# Patient Record
Sex: MALE | Race: BLACK OR AFRICAN AMERICAN | Employment: STUDENT | URBAN - METROPOLITAN AREA
[De-identification: names, ages, dates, MRNs, and addresses within clinical notes are randomized per-mention and may not be internally consistent; named-entity substitution may affect disease eponyms.]

---

## 2017-06-18 ENCOUNTER — HOSPITAL ENCOUNTER (INPATIENT)
Age: 22
LOS: 2 days | Discharge: HOME | DRG: 282 | End: 2017-06-20
Attending: EMERGENCY MEDICINE | Admitting: INTERNAL MEDICINE
Payer: COMMERCIAL

## 2017-06-18 ENCOUNTER — APPOINTMENT (OUTPATIENT)
Dept: GENERAL RADIOLOGY | Age: 22
DRG: 282 | End: 2017-06-18
Attending: EMERGENCY MEDICINE
Payer: COMMERCIAL

## 2017-06-18 DIAGNOSIS — R07.9 ACUTE CHEST PAIN: ICD-10-CM

## 2017-06-18 DIAGNOSIS — R77.8 ELEVATED TROPONIN: Primary | ICD-10-CM

## 2017-06-18 PROBLEM — I21.4 NSTEMI (NON-ST ELEVATED MYOCARDIAL INFARCTION) (HCC): Status: ACTIVE | Noted: 2017-06-18

## 2017-06-18 LAB
ALBUMIN SERPL BCP-MCNC: 3.7 G/DL (ref 3.5–5)
ALBUMIN/GLOB SERPL: 0.8 {RATIO} (ref 1.1–2.2)
ALP SERPL-CCNC: 39 U/L (ref 45–117)
ALT SERPL-CCNC: 28 U/L (ref 12–78)
ANION GAP BLD CALC-SCNC: 12 MMOL/L (ref 5–15)
AST SERPL W P-5'-P-CCNC: 19 U/L (ref 15–37)
ATRIAL RATE: 65 BPM
ATRIAL RATE: 75 BPM
ATRIAL RATE: 91 BPM
B PERT DNA SPEC QL NAA+PROBE: NOT DETECTED
BASOPHILS # BLD AUTO: 0 K/UL (ref 0–0.1)
BASOPHILS # BLD: 0 % (ref 0–1)
BILIRUB DIRECT SERPL-MCNC: 0.2 MG/DL (ref 0–0.2)
BILIRUB SERPL-MCNC: 0.6 MG/DL (ref 0.2–1)
BUN SERPL-MCNC: 15 MG/DL (ref 6–20)
BUN/CREAT SERPL: 11 (ref 12–20)
C PNEUM DNA SPEC QL NAA+PROBE: NOT DETECTED
CALCIUM SERPL-MCNC: 9.1 MG/DL (ref 8.5–10.1)
CALCULATED P AXIS, ECG09: 25 DEGREES
CALCULATED P AXIS, ECG09: 26 DEGREES
CALCULATED P AXIS, ECG09: 28 DEGREES
CALCULATED R AXIS, ECG10: 19 DEGREES
CALCULATED R AXIS, ECG10: 22 DEGREES
CALCULATED R AXIS, ECG10: 33 DEGREES
CALCULATED T AXIS, ECG11: 115 DEGREES
CALCULATED T AXIS, ECG11: 90 DEGREES
CALCULATED T AXIS, ECG11: 95 DEGREES
CHLORIDE SERPL-SCNC: 100 MMOL/L (ref 97–108)
CO2 SERPL-SCNC: 24 MMOL/L (ref 21–32)
CREAT SERPL-MCNC: 1.32 MG/DL (ref 0.7–1.3)
DIAGNOSIS, 93000: NORMAL
DIFFERENTIAL METHOD BLD: ABNORMAL
EOSINOPHIL # BLD: 0 K/UL (ref 0–0.4)
EOSINOPHIL NFR BLD: 0 % (ref 0–7)
ERYTHROCYTE [DISTWIDTH] IN BLOOD BY AUTOMATED COUNT: 13.9 % (ref 11.5–14.5)
FLUAV H1 2009 PAND RNA SPEC QL NAA+PROBE: NOT DETECTED
FLUAV H1 RNA SPEC QL NAA+PROBE: NOT DETECTED
FLUAV H3 RNA SPEC QL NAA+PROBE: NOT DETECTED
FLUAV SUBTYP SPEC NAA+PROBE: NOT DETECTED
FLUBV RNA SPEC QL NAA+PROBE: NOT DETECTED
GLOBULIN SER CALC-MCNC: 4.5 G/DL (ref 2–4)
GLUCOSE SERPL-MCNC: 98 MG/DL (ref 65–100)
HADV DNA SPEC QL NAA+PROBE: NOT DETECTED
HCOV 229E RNA SPEC QL NAA+PROBE: NOT DETECTED
HCOV HKU1 RNA SPEC QL NAA+PROBE: NOT DETECTED
HCOV NL63 RNA SPEC QL NAA+PROBE: NOT DETECTED
HCOV OC43 RNA SPEC QL NAA+PROBE: NOT DETECTED
HCT VFR BLD AUTO: 40 % (ref 36.6–50.3)
HGB BLD-MCNC: 13.2 G/DL (ref 12.1–17)
HMPV RNA SPEC QL NAA+PROBE: NOT DETECTED
HPIV1 RNA SPEC QL NAA+PROBE: NOT DETECTED
HPIV2 RNA SPEC QL NAA+PROBE: NOT DETECTED
HPIV3 RNA SPEC QL NAA+PROBE: NOT DETECTED
HPIV4 RNA SPEC QL NAA+PROBE: NOT DETECTED
LYMPHOCYTES # BLD AUTO: 20 % (ref 12–49)
LYMPHOCYTES # BLD: 2.4 K/UL (ref 0.8–3.5)
M PNEUMO DNA SPEC QL NAA+PROBE: NOT DETECTED
MCH RBC QN AUTO: 25.7 PG (ref 26–34)
MCHC RBC AUTO-ENTMCNC: 33 G/DL (ref 30–36.5)
MCV RBC AUTO: 77.8 FL (ref 80–99)
MONOCYTES # BLD: 2.1 K/UL (ref 0–1)
MONOCYTES NFR BLD AUTO: 18 % (ref 5–13)
NEUTS BAND NFR BLD MANUAL: 1 % (ref 0–6)
NEUTS SEG # BLD: 7.4 K/UL (ref 1.8–8)
NEUTS SEG NFR BLD AUTO: 61 % (ref 32–75)
P-R INTERVAL, ECG05: 168 MS
P-R INTERVAL, ECG05: 176 MS
P-R INTERVAL, ECG05: 200 MS
PATH REV BLD -IMP: ABNORMAL
PLATELET # BLD AUTO: 293 K/UL (ref 150–400)
PLATELET COMMENTS,PCOM: ABNORMAL
POTASSIUM SERPL-SCNC: 3.7 MMOL/L (ref 3.5–5.1)
PROT SERPL-MCNC: 8.2 G/DL (ref 6.4–8.2)
Q-T INTERVAL, ECG07: 338 MS
Q-T INTERVAL, ECG07: 380 MS
Q-T INTERVAL, ECG07: 392 MS
QRS DURATION, ECG06: 100 MS
QRS DURATION, ECG06: 100 MS
QRS DURATION, ECG06: 102 MS
QTC CALCULATION (BEZET), ECG08: 395 MS
QTC CALCULATION (BEZET), ECG08: 415 MS
QTC CALCULATION (BEZET), ECG08: 416 MS
RBC # BLD AUTO: 5.14 M/UL (ref 4.1–5.7)
RBC MORPH BLD: ABNORMAL
RSV RNA SPEC QL NAA+PROBE: NOT DETECTED
RV+EV RNA SPEC QL NAA+PROBE: NOT DETECTED
SODIUM SERPL-SCNC: 136 MMOL/L (ref 136–145)
TROPONIN I SERPL-MCNC: 0.76 NG/ML
TROPONIN I SERPL-MCNC: 1.06 NG/ML
TROPONIN I SERPL-MCNC: 2.28 NG/ML
TROPONIN I SERPL-MCNC: 2.54 NG/ML
VENTRICULAR RATE, ECG03: 65 BPM
VENTRICULAR RATE, ECG03: 68 BPM
VENTRICULAR RATE, ECG03: 91 BPM
WBC # BLD AUTO: 11.9 K/UL (ref 4.1–11.1)
WBC MORPH BLD: ABNORMAL

## 2017-06-18 PROCEDURE — 93458 L HRT ARTERY/VENTRICLE ANGIO: CPT

## 2017-06-18 PROCEDURE — 86658 ENTEROVIRUS ANTIBODY: CPT | Performed by: INTERNAL MEDICINE

## 2017-06-18 PROCEDURE — 77010033678 HC OXYGEN DAILY

## 2017-06-18 PROCEDURE — C1894 INTRO/SHEATH, NON-LASER: HCPCS

## 2017-06-18 PROCEDURE — 74011250637 HC RX REV CODE- 250/637: Performed by: INTERNAL MEDICINE

## 2017-06-18 PROCEDURE — 74011000250 HC RX REV CODE- 250: Performed by: INTERNAL MEDICINE

## 2017-06-18 PROCEDURE — 77030004532 HC CATH ANGI DX IMP BSC -A

## 2017-06-18 PROCEDURE — 71020 XR CHEST PA LAT: CPT

## 2017-06-18 PROCEDURE — 84484 ASSAY OF TROPONIN QUANT: CPT | Performed by: EMERGENCY MEDICINE

## 2017-06-18 PROCEDURE — B2151ZZ FLUOROSCOPY OF LEFT HEART USING LOW OSMOLAR CONTRAST: ICD-10-PCS | Performed by: INTERNAL MEDICINE

## 2017-06-18 PROCEDURE — 99284 EMERGENCY DEPT VISIT MOD MDM: CPT

## 2017-06-18 PROCEDURE — C1760 CLOSURE DEV, VASC: HCPCS

## 2017-06-18 PROCEDURE — B2111ZZ FLUOROSCOPY OF MULTIPLE CORONARY ARTERIES USING LOW OSMOLAR CONTRAST: ICD-10-PCS | Performed by: INTERNAL MEDICINE

## 2017-06-18 PROCEDURE — 74011250637 HC RX REV CODE- 250/637: Performed by: EMERGENCY MEDICINE

## 2017-06-18 PROCEDURE — 74011636320 HC RX REV CODE- 636/320: Performed by: INTERNAL MEDICINE

## 2017-06-18 PROCEDURE — 77030013715 HC INFL SYS MRTM -B

## 2017-06-18 PROCEDURE — 93041 RHYTHM ECG TRACING: CPT

## 2017-06-18 PROCEDURE — 65610000006 HC RM INTENSIVE CARE

## 2017-06-18 PROCEDURE — 93005 ELECTROCARDIOGRAM TRACING: CPT

## 2017-06-18 PROCEDURE — 87633 RESP VIRUS 12-25 TARGETS: CPT | Performed by: EMERGENCY MEDICINE

## 2017-06-18 PROCEDURE — 77030004533 HC CATH ANGI DX IMP BSC -B

## 2017-06-18 PROCEDURE — 80076 HEPATIC FUNCTION PANEL: CPT | Performed by: EMERGENCY MEDICINE

## 2017-06-18 PROCEDURE — 85025 COMPLETE CBC W/AUTO DIFF WBC: CPT | Performed by: EMERGENCY MEDICINE

## 2017-06-18 PROCEDURE — 80048 BASIC METABOLIC PNL TOTAL CA: CPT | Performed by: EMERGENCY MEDICINE

## 2017-06-18 PROCEDURE — 4A023N7 MEASUREMENT OF CARDIAC SAMPLING AND PRESSURE, LEFT HEART, PERCUTANEOUS APPROACH: ICD-10-PCS | Performed by: INTERNAL MEDICINE

## 2017-06-18 PROCEDURE — 74011250636 HC RX REV CODE- 250/636: Performed by: INTERNAL MEDICINE

## 2017-06-18 PROCEDURE — 77030013744

## 2017-06-18 PROCEDURE — 36415 COLL VENOUS BLD VENIPUNCTURE: CPT | Performed by: INTERNAL MEDICINE

## 2017-06-18 RX ORDER — FENTANYL CITRATE 50 UG/ML
25-200 INJECTION, SOLUTION INTRAMUSCULAR; INTRAVENOUS
Status: DISCONTINUED | OUTPATIENT
Start: 2017-06-18 | End: 2017-06-18

## 2017-06-18 RX ORDER — PRASUGREL 10 MG/1
10-60 TABLET, FILM COATED ORAL AS NEEDED
Status: DISCONTINUED | OUTPATIENT
Start: 2017-06-18 | End: 2017-06-18

## 2017-06-18 RX ORDER — GUAIFENESIN 100 MG/5ML
200 SOLUTION ORAL
COMMUNITY

## 2017-06-18 RX ORDER — MIDAZOLAM HYDROCHLORIDE 1 MG/ML
.5-1 INJECTION, SOLUTION INTRAMUSCULAR; INTRAVENOUS
Status: DISCONTINUED | OUTPATIENT
Start: 2017-06-18 | End: 2017-06-18

## 2017-06-18 RX ORDER — GUAIFENESIN 100 MG/5ML
324 LIQUID (ML) ORAL
Status: COMPLETED | OUTPATIENT
Start: 2017-06-18 | End: 2017-06-18

## 2017-06-18 RX ORDER — IBUPROFEN 600 MG/1
600 TABLET ORAL
Status: COMPLETED | OUTPATIENT
Start: 2017-06-18 | End: 2017-06-18

## 2017-06-18 RX ORDER — SODIUM CHLORIDE 0.9 % (FLUSH) 0.9 %
5-10 SYRINGE (ML) INJECTION AS NEEDED
Status: DISCONTINUED | OUTPATIENT
Start: 2017-06-18 | End: 2017-06-20 | Stop reason: HOSPADM

## 2017-06-18 RX ORDER — CARVEDILOL 6.25 MG/1
6.25 TABLET ORAL 2 TIMES DAILY WITH MEALS
Status: DISCONTINUED | OUTPATIENT
Start: 2017-06-18 | End: 2017-06-20 | Stop reason: HOSPADM

## 2017-06-18 RX ORDER — METOPROLOL TARTRATE 5 MG/5ML
5 INJECTION INTRAVENOUS
Status: DISCONTINUED | OUTPATIENT
Start: 2017-06-18 | End: 2017-06-18

## 2017-06-18 RX ORDER — HEPARIN SODIUM 200 [USP'U]/100ML
1000 INJECTION, SOLUTION INTRAVENOUS AS NEEDED
Status: DISCONTINUED | OUTPATIENT
Start: 2017-06-18 | End: 2017-06-18

## 2017-06-18 RX ORDER — HYDROCORTISONE SODIUM SUCCINATE 100 MG/2ML
100 INJECTION, POWDER, FOR SOLUTION INTRAMUSCULAR; INTRAVENOUS EVERY 8 HOURS
Status: DISCONTINUED | OUTPATIENT
Start: 2017-06-18 | End: 2017-06-19

## 2017-06-18 RX ORDER — AMLODIPINE BESYLATE 5 MG/1
10 TABLET ORAL DAILY
Status: DISCONTINUED | OUTPATIENT
Start: 2017-06-18 | End: 2017-06-20 | Stop reason: HOSPADM

## 2017-06-18 RX ORDER — DOXYCYCLINE 100 MG/1
100 CAPSULE ORAL 2 TIMES DAILY
COMMUNITY

## 2017-06-18 RX ORDER — SODIUM CHLORIDE 9 MG/ML
1.5 INJECTION, SOLUTION INTRAVENOUS CONTINUOUS
Status: DISCONTINUED | OUTPATIENT
Start: 2017-06-18 | End: 2017-06-18 | Stop reason: HOSPADM

## 2017-06-18 RX ORDER — SODIUM CHLORIDE 9 MG/ML
125 INJECTION, SOLUTION INTRAVENOUS CONTINUOUS
Status: DISCONTINUED | OUTPATIENT
Start: 2017-06-18 | End: 2017-06-19

## 2017-06-18 RX ORDER — LIDOCAINE HYDROCHLORIDE 10 MG/ML
10-30 INJECTION INFILTRATION; PERINEURAL ONCE
Status: COMPLETED | OUTPATIENT
Start: 2017-06-18 | End: 2017-06-18

## 2017-06-18 RX ORDER — CLOPIDOGREL 300 MG/1
600 TABLET, FILM COATED ORAL AS NEEDED
Status: DISCONTINUED | OUTPATIENT
Start: 2017-06-18 | End: 2017-06-18

## 2017-06-18 RX ORDER — SODIUM CHLORIDE 9 MG/ML
3 INJECTION, SOLUTION INTRAVENOUS CONTINUOUS
Status: DISCONTINUED | OUTPATIENT
Start: 2017-06-18 | End: 2017-06-18 | Stop reason: HOSPADM

## 2017-06-18 RX ORDER — SODIUM CHLORIDE 0.9 % (FLUSH) 0.9 %
5-10 SYRINGE (ML) INJECTION EVERY 8 HOURS
Status: DISCONTINUED | OUTPATIENT
Start: 2017-06-18 | End: 2017-06-20 | Stop reason: HOSPADM

## 2017-06-18 RX ORDER — HEPARIN SODIUM 1000 [USP'U]/ML
10000 INJECTION, SOLUTION INTRAVENOUS; SUBCUTANEOUS
Status: DISCONTINUED | OUTPATIENT
Start: 2017-06-18 | End: 2017-06-18

## 2017-06-18 RX ORDER — MORPHINE SULFATE 2 MG/ML
2 INJECTION, SOLUTION INTRAMUSCULAR; INTRAVENOUS
Status: DISCONTINUED | OUTPATIENT
Start: 2017-06-18 | End: 2017-06-20 | Stop reason: HOSPADM

## 2017-06-18 RX ORDER — SODIUM CHLORIDE 0.9 % (FLUSH) 0.9 %
10 SYRINGE (ML) INJECTION AS NEEDED
Status: DISCONTINUED | OUTPATIENT
Start: 2017-06-18 | End: 2017-06-18

## 2017-06-18 RX ORDER — ACETAMINOPHEN 325 MG/1
650 TABLET ORAL
Status: DISCONTINUED | OUTPATIENT
Start: 2017-06-18 | End: 2017-06-20 | Stop reason: HOSPADM

## 2017-06-18 RX ORDER — ATROPINE SULFATE 0.1 MG/ML
1 INJECTION INTRAVENOUS AS NEEDED
Status: DISCONTINUED | OUTPATIENT
Start: 2017-06-18 | End: 2017-06-18

## 2017-06-18 RX ADMIN — Medication 2 MG: at 14:53

## 2017-06-18 RX ADMIN — Medication 10 ML: at 23:00

## 2017-06-18 RX ADMIN — SODIUM CHLORIDE 125 ML/HR: 900 INJECTION, SOLUTION INTRAVENOUS at 14:53

## 2017-06-18 RX ADMIN — CARVEDILOL 6.25 MG: 6.25 TABLET, FILM COATED ORAL at 08:48

## 2017-06-18 RX ADMIN — MIDAZOLAM HYDROCHLORIDE 2 MG: 1 INJECTION, SOLUTION INTRAMUSCULAR; INTRAVENOUS at 04:30

## 2017-06-18 RX ADMIN — ACETAMINOPHEN 650 MG: 325 TABLET, FILM COATED ORAL at 05:43

## 2017-06-18 RX ADMIN — HEPARIN SODIUM 2000 UNITS: 200 INJECTION, SOLUTION INTRAVENOUS at 04:32

## 2017-06-18 RX ADMIN — IBUPROFEN 600 MG: 600 TABLET, FILM COATED ORAL at 00:20

## 2017-06-18 RX ADMIN — SODIUM CHLORIDE 3 ML/KG/HR: 900 INJECTION, SOLUTION INTRAVENOUS at 04:24

## 2017-06-18 RX ADMIN — HYDROCORTISONE SODIUM SUCCINATE 100 MG: 100 INJECTION, POWDER, FOR SOLUTION INTRAMUSCULAR; INTRAVENOUS at 23:00

## 2017-06-18 RX ADMIN — IOPAMIDOL 88 ML: 755 INJECTION, SOLUTION INTRAVENOUS at 04:44

## 2017-06-18 RX ADMIN — SODIUM CHLORIDE 125 ML/HR: 900 INJECTION, SOLUTION INTRAVENOUS at 21:00

## 2017-06-18 RX ADMIN — Medication 10 ML: at 05:43

## 2017-06-18 RX ADMIN — AMLODIPINE BESYLATE 10 MG: 5 TABLET ORAL at 08:48

## 2017-06-18 RX ADMIN — HYDROCORTISONE SODIUM SUCCINATE 100 MG: 100 INJECTION, POWDER, FOR SOLUTION INTRAMUSCULAR; INTRAVENOUS at 05:43

## 2017-06-18 RX ADMIN — CARVEDILOL 6.25 MG: 6.25 TABLET, FILM COATED ORAL at 16:45

## 2017-06-18 RX ADMIN — FENTANYL CITRATE 50 MCG: 50 INJECTION, SOLUTION INTRAMUSCULAR; INTRAVENOUS at 04:30

## 2017-06-18 RX ADMIN — HYDROCORTISONE SODIUM SUCCINATE 100 MG: 100 INJECTION, POWDER, FOR SOLUTION INTRAMUSCULAR; INTRAVENOUS at 14:53

## 2017-06-18 RX ADMIN — ASPIRIN 81 MG 324 MG: 81 TABLET ORAL at 02:44

## 2017-06-18 RX ADMIN — SODIUM CHLORIDE 125 ML/HR: 900 INJECTION, SOLUTION INTRAVENOUS at 07:13

## 2017-06-18 RX ADMIN — METOROPROLOL TARTRATE 5 MG: 5 INJECTION, SOLUTION INTRAVENOUS at 04:32

## 2017-06-18 RX ADMIN — LIDOCAINE HYDROCHLORIDE 10 ML: 10 INJECTION, SOLUTION INFILTRATION; PERINEURAL at 04:31

## 2017-06-18 NOTE — PROGRESS NOTES
0730: Report received from [address]: Bedside shift change report given to COBY Chow (oncoming nurse) by Anjel Fuchs RN (offgoing nurse). Report included the following information SBAR, Kardex, ED Summary, Procedure Summary, Intake/Output, MAR, Recent Results and Cardiac Rhythm NSR/SB.

## 2017-06-18 NOTE — PROGRESS NOTES
Cardiac Cath Lab Procedure Area Arrival Note:    Je Lucas arrived to Cardiac Cath Lab, Procedure Area. Patient identifiers verified with NAME and DATE OF BIRTH. Procedure verified with patient. Consent forms verified. Allergies verified. Patient informed of procedure and plan of care. Questions answered with review. Patient voiced understanding of procedure and plan of care. Patient on cardiac monitor, non-invasive blood pressure, SPO2 monitorO2 @ 2 lpm via NC.  IV of NS on pump at 100 ml/hr. Patient status doing well with some problems : Pt diaphoretic. Patient is A&Ox 4. Patient reports CP 6/10. Patient medicated during procedure with orders obtained and verified by Dr. Lisa Melton. Refer to patients Cardiac Cath Lab PROCEDURE REPORT for vital signs, assessment, status, and response during procedure, printed at end of case. Printed report on chart or scanned into chart.

## 2017-06-18 NOTE — H&P
Cardiology History and Physical     Date of  Admission: 6/18/2017     Admission type: Emergency    Francesco Call is a 23 y.o. male admitted for CP. It is SS location with various aches in arms and shoulder area. This started last night and is persisting 6/10. He has profuse sweats which is very unusual. This all started with some non-productive cough for last two days but CP only started tonight. It is not pleuritic or positional. His CRFs are family history or premature CAD in his uncle and HTN. He has not checked his lipids recently. His first troponin is elevated at 2.28 and ekg shows ST elevation in II, III, & AVF. There is reciprocal ST depression in I & AVL. Then there is early repolarization ST elevation in anterior precordial leads. No prior ekg for comparison. Mariana Cedeno MD  Past Medical History:   Diagnosis Date    Hypertension       Past Surgical History:   Procedure Laterality Date    HX APPENDECTOMY       History reviewed. No pertinent family history. Prior to Admission medications    Medication Sig Start Date End Date Taking? Authorizing Provider   guaiFENesin (ROBITUSSIN) 100 mg/5 mL liquid Take 200 mg by mouth three (3) times daily as needed for Cough. Yes Oskar Sarah MD   doxycycline (MONODOX) 100 mg capsule Take 100 mg by mouth two (2) times a day. Yes Oskar Sarah MD   Saccharomyces boulardii (FLORASTORKIDS) 250 mg pack Take 250 mg by mouth two (2) times a day. Oskar Sarah MD   amLODIPine (NORVASC) 10 mg tablet Take  by mouth daily. Oskar Sarah MD      No Known Allergies     Review of Systems    Review of Systems  Except as noted in HPI, patient denies recent fever or chills, nausea, vomiting, diarrhea, hemoptysis, hematemesis, dysuria, myalgias, focal neurologic symptoms, ecchymosis, angioedema, odynophagia, dysphagia, sore throat, earache,rash, melena, hematochezia, depression, GERD, cold intolerance, petechia, bleeding gums, or significant weight loss.     A comprehensive review of systems was negative except for that written in the HPI. Physical Exam    Objective:    Physical Exam:  24 hr VS reviewed, overall VSSAF  Temp (24hrs), Av.4 °F (36.9 °C), Min:98.3 °F (36.8 °C), Max:98.5 °F (36.9 °C)    Patient Vitals for the past 8 hrs:   Pulse   17 0240 73   17 0011 81    Patient Vitals for the past 8 hrs:   Resp   17 0240 20   17 0011 20    Patient Vitals for the past 8 hrs:   BP   17 0240 134/68   17 0011 134/78        No intake or output data in the 24 hours ending 17 0352  General Appearance:   [x] well developed, well nourished,  [x]NAD.       []     agitated   []     lethargic but arousable  []     obtunded   ENT, Palate:    [x] WNL   []     dry palate/MM     [x]anicteric     Respiratory:    [x]CTA bilateral  [] rales  [] rhonchi  [] normal resp effort   Cardiovascular:   [x] RRR   [] Irregular rate and rhythm  [] ectopy   [x] Normal S1, S2   [x]  s4 gallop    [] no murmur   [] murmur c/w:   [x]  no edema RLE:[]1+ [] 2+ []3+; LLE:[]1+ []2+ [] 3+   [x]  normal JVP   []     Elevated JVP    [x] carotid upstroke nl   [x] abd aorta not palpated   [x] no stigmata of peripheral emboli   GI:    [x] abd soft, non-distented,bowel sounds present, no                     organomegaly appreciated   Skin:  Neuro:      [x]  warm and dry []     cold extremities   [x]  A/O x 3,  [x]     grossly non-focal    []  lethargic but arousable  [] obtunded        Cardiographics    Telemetry: normal sinus rhythm  ECG: normal EKG, normal sinus rhythm, unchanged from previous tracings  Echocardiogram: Not done    Labs:   Recent Results (from the past 24 hour(s))   EKG, 12 LEAD, INITIAL    Collection Time: 17  1:19 AM   Result Value Ref Range    Ventricular Rate 66 BPM    Atrial Rate 66 BPM    P-R Interval 184 ms    QRS Duration 100 ms    Q-T Interval 376 ms    QTC Calculation (Bezet) 394 ms    Calculated P Axis 24 degrees    Calculated R Axis 33 degrees    Calculated T Axis 108 degrees    Diagnosis       Normal sinus rhythm with sinus arrhythmia  ST elevation, consider inferior injury or acute infarct  ** ** ACUTE MI / STEMI ** **  Consider right ventricular involvement in acute inferior infarct  No previous ECGs available     CBC WITH AUTOMATED DIFF    Collection Time: 06/18/17  1:36 AM   Result Value Ref Range    WBC 11.9 (H) 4.1 - 11.1 K/uL    RBC 5.14 4.10 - 5.70 M/uL    HGB 13.2 12.1 - 17.0 g/dL    HCT 40.0 36.6 - 50.3 %    MCV 77.8 (L) 80.0 - 99.0 FL    MCH 25.7 (L) 26.0 - 34.0 PG    MCHC 33.0 30.0 - 36.5 g/dL    RDW 13.9 11.5 - 14.5 %    PLATELET 261 467 - 650 K/uL    NEUTROPHILS 61 32 - 75 %    BAND NEUTROPHILS 1 0 - 6 %    LYMPHOCYTES 20 12 - 49 %    MONOCYTES 18 (H) 5 - 13 %    EOSINOPHILS 0 0 - 7 %    BASOPHILS 0 0 - 1 %    ABS. NEUTROPHILS 7.4 1.8 - 8.0 K/UL    ABS. LYMPHOCYTES 2.4 0.8 - 3.5 K/UL    ABS. MONOCYTES 2.1 (H) 0.0 - 1.0 K/UL    ABS. EOSINOPHILS 0.0 0.0 - 0.4 K/UL    ABS.  BASOPHILS 0.0 0.0 - 0.1 K/UL    DF MANUAL      PLATELET COMMENTS LARGE PLATELETS      RBC COMMENTS ANISOCYTOSIS  1+        WBC COMMENTS Pathology Review Requested     METABOLIC PANEL, BASIC    Collection Time: 06/18/17  1:36 AM   Result Value Ref Range    Sodium 136 136 - 145 mmol/L    Potassium 3.7 3.5 - 5.1 mmol/L    Chloride 100 97 - 108 mmol/L    CO2 24 21 - 32 mmol/L    Anion gap 12 5 - 15 mmol/L    Glucose 98 65 - 100 mg/dL    BUN 15 6 - 20 MG/DL    Creatinine 1.32 (H) 0.70 - 1.30 MG/DL    BUN/Creatinine ratio 11 (L) 12 - 20      GFR est AA >60 >60 ml/min/1.73m2    GFR est non-AA >60 >60 ml/min/1.73m2    Calcium 9.1 8.5 - 10.1 MG/DL   TROPONIN I    Collection Time: 06/18/17  1:36 AM   Result Value Ref Range    Troponin-I, Qt. 2.28 (H) <0.05 ng/mL   HEPATIC FUNCTION PANEL    Collection Time: 06/18/17  1:36 AM   Result Value Ref Range    Protein, total 8.2 6.4 - 8.2 g/dL    Albumin 3.7 3.5 - 5.0 g/dL    Globulin 4.5 (H) 2.0 - 4.0 g/dL    A-G Ratio 0.8 (L) 1.1 - 2.2      Bilirubin, total 0.6 0.2 - 1.0 MG/DL    Bilirubin, direct 0.2 0.0 - 0.2 MG/DL    Alk. phosphatase 39 (L) 45 - 117 U/L    AST (SGOT) 19 15 - 37 U/L    ALT (SGPT) 28 12 - 78 U/L        Assessment:     Assessment:      Active Problems:    * No active hospital problems. *  CP; consistent with coming from heart; myocarditis  Elevated troponins; consistent with NSTEMI; consider coronary event vs myocarditis  HTN; uncontrolled  Family h/o premature CAD     Plan:    To cath lab to rule out ischemic cause for elevated troponin and ongoing chest pain      Signed By: Trista Gutierrez MD     June 18, 2017

## 2017-06-18 NOTE — PROGRESS NOTES
TRANSFER - OUT REPORT:    Verbal report given to  [address] on Melissa Valladares being transferred to ICU for routine progression of care       Report consisted of patients Situation, Background, Assessment and   Recommendations(SBAR). Information from the following report(s) SBAR was reviewed with the receiving nurse. Opportunity for questions and clarification was provided.

## 2017-06-18 NOTE — ED NOTES
Pt. States chest pain has improved since taking Ibuprofen. Parents at bedside. Updated family on plan of care. Will continue to monitor.

## 2017-06-18 NOTE — IP AVS SNAPSHOT
[address] 
[phone] Patient: Luma Vaughan MRN: [id number] :1995 You are allergic to the following No active allergies Recent Documentation Height Weight BMI Smoking Status 1.791 m 125.1 kg 39 kg/m2 Never Smoker Unresulted Labs Order Current Status [facility] VIRUS AB In process Emergency Contacts Name Discharge Info Relation Home Work Mobile [address] CAREGIVER [3] Mother [14] [phone] About your hospitalization You were admitted on:  2017 You last received care in the:  [address] You were discharged on:  2017 Unit phone number:  [phone] Why you were hospitalized Your primary diagnosis was:  Not on File Your diagnoses also included:  Chest Pain, Nstemi (Non-St Elevated Myocardial Infarction) (Hcc) Providers Seen During Your Hospitalizations Provider Role Specialty Primary office phone Cleatus Cushing, MD Attending Provider Emergency Medicine [phone] Yana Boucher MD Attending Provider Cardiology [phone] Your Primary Care Physician (PCP) Primary Care Physician Office Phone Office Fax Claudell Brill [phone] Follow-up Information Follow up With Details Comments Contact Info Jackie Lopez MD   [address] [place] [id number] 
[phone] Current Discharge Medication List  
  
START taking these medications Dose & Instructions Dispensing Information Comments Morning Noon Evening Bedtime  
 carvedilol 6.25 mg tablet Commonly known as:  Markel Combe Your last dose was: Your next dose is:    
   
   
 Dose:  6.25 mg Take 1 Tab by mouth two (2) times daily (with meals). Quantity:  60 Tab Refills:  6 ibuprofen 400 mg tablet Commonly known as:  MOTRIN Your last dose was: Your next dose is:    
   
   
 Dose:  400 mg Take 1 Tab by mouth three (3) times daily as needed. Quantity:  50 Tab Refills:  1 CONTINUE these medications which have NOT CHANGED Dose & Instructions Dispensing Information Comments Morning Noon Evening Bedtime  
 amLODIPine 10 mg tablet Commonly known as:  Misha Moreland Your last dose was: Your next dose is: Take  by mouth daily. Refills:  0  
     
   
   
   
  
 doxycycline 100 mg capsule Commonly known as:  Yury Standing Your last dose was: Your next dose is:    
   
   
 Dose:  100 mg Take 100 mg by mouth two (2) times a day. Refills:  0  
     
   
   
   
  
 FLORASTORKIDS 250 mg Pack Generic drug:  Saccharomyces boulardii Your last dose was: Your next dose is:    
   
   
 Dose:  250 mg Take 250 mg by mouth two (2) times a day. Refills:  0  
     
   
   
   
  
 guaiFENesin 100 mg/5 mL liquid Commonly known as:  ROBITUSSIN Your last dose was: Your next dose is:    
   
   
 Dose:  200 mg Take 200 mg by mouth three (3) times daily as needed for Cough. Refills:  0 Where to Get Your Medications Information on where to get these meds will be given to you by the nurse or doctor. ! Ask your nurse or doctor about these medications  
  carvedilol 6.25 mg tablet  
 ibuprofen 400 mg tablet Discharge Instructions   
  
 
 
 
   
 
   [email or web address] Patient Discharge Instructions Giuliana Bills / [id number] : 1995 Admitted 2017 Discharged: 2017 · It is important that you take the medication exactly as they are prescribed.   
· Keep your medication in the bottles provided by the pharmacist and keep a list of the medication names, dosages, and times to be taken in your wallet. · Do not take other medications without consulting your doctor. BRING ALL OF YOUR MEDICINES TO YOUR OFFICE VISIT with Quynh Pickard What to do at [facility] Recommended activity: Activity as tolerated, Follow-up with Lisa Nassar MD in 1 month Follow-up with your primary care physician in 3 month Lifestyle changes Do not smoke. Smoking increases your risk of another heart attack. If you need help quitting, talk to your doctor about stop-smoking programs and medicines. These can increase your chances of quitting for good. Eat a heart-healthy diet that is low in cholesterol, saturated fat, and salt, and is full of fruits, vegetables and whole-grains. Eat at least two servings of fish each week. You may get more details about how to eat healthy, but these tips can help you get started. Avoid colds and flu. Get a pneumococcal vaccine shot. If you have had one before, ask your doctor whether you need a second dose. Get a flu shot every fall. If you must be around people with colds or flu, wash your hands often. When should you call for help? Call 911 anytime you think you may need emergency care. For example, call if: 
You have signs of a heart attack. These may include: 
Chest pain or pressure. Sweating. Shortness of breath. Nausea or vomiting. Pain that spreads from the chest to the neck, jaw, or one or both shoulders or arms. Dizziness or lightheadedness. A fast or irregular pulse. After calling 911, chew 1 adult-strength aspirin. Wait for an ambulance. Do not try to drive yourself. You passed out (lost consciousness). You feel like you are having another heart attack. Call your doctor now or seek immediate medical care if: 
You have had any chest pain, even if it has gone away. You have new or increased shortness of breath. You are dizzy or lightheaded, or you feel like you may faint.  
 
Watch closely for changes in your health, and be sure to contact your doctor if you have any problem Information obtained by : 
I understand that if any problems occur once I am at home I am to contact my physician. I understand and acknowledge receipt of the instructions indicated above. R.N.'s Signature                                                                  Date/Time Patient or Representative Signature                                                          Date/Time Aleyda Kelsey MD 
 
 
                 Cardiac Catheterization/Angiography Discharge Instructions *Check the puncture site frequently for swelling or bleeding. If you see any bleeding, lie down and apply pressure over the area with a clean town or washcloth. Notify your doctor for any redness, swelling, drainage or oozing from the puncture site. Notify your doctor for any fever or chills. *If the leg or arm with the puncture becomes cold, numb or painful, call Dr Shabana Beckman at [phone] *Activity should be limited for the next 48 hours. Climb stairs as little as possible and avoid any stooping, bending or strenuous activity for 48 hours. No heavy lifting (anything over 10 pounds) for three days. *Do not drive for 48 hours. *You may resume your usual diet. Drink more fluids than usual. 
 
*Have a responsible person drive you home and stay with you for at least 24 hours after your heart catheterization/angiography. *You may remove the bandage from your Right and Groin in 24 hours. You may shower in 24 hours. No tub baths, hot tubs or swimming for one week. Do not place any lotions, creams, powders, ointments over the puncture site for one week.  You may place a clean band-aid over the puncture site each day for 5 days. Change this daily. Discharge Orders None [employer] Announcement We are excited to announce that we are making your provider's discharge notes available to you in [employer]. You will see these notes when they are completed and signed by the physician that discharged you from your recent hospital stay. If you have any questions or concerns about any information you see in [employer], please call the Health Information Department where you were seen or reach out to your Primary Care Provider for more information about your plan of care. Introducing [facility]! [facility] introduces [employer] patient portal. Now you can access parts of your medical record, email your doctor's office, and request medication refills online. 1. In your internet browser, go to [email or web address] 2. Click on the First Time User? Click Here link in the Sign In box. You will see the New Member Sign Up page. 3. Enter your [employer] Access Code exactly as it appears below. You will not need to use this code after youve completed the sign-up process. If you do not sign up before the expiration date, you must request a new code. · [employer] Access Code: [email or web address] Expires: 9/18/2017 11:26 AM 
 
4. Enter the last four digits of your Social Security Number (xxxx) and Date of Birth (mm/dd/yyyy) as indicated and click Submit. You will be taken to the next sign-up page. 5. Create a [employer] ID. This will be your [employer] login ID and cannot be changed, so think of one that is secure and easy to remember. 6. Create a [employer] password. You can change your password at any time. 7. Enter your Password Reset Question and Answer. This can be used at a later time if you forget your password. 8. Enter your e-mail address. You will receive e-mail notification when new information is available in [address] 9. Click Sign Up. You can now view and download portions of your medical record. 10. Click the Download Summary menu link to download a portable copy of your medical information. If you have questions, please visit the Frequently Asked Questions section of the [employer] website. Remember, [employer] is NOT to be used for urgent needs. For medical emergencies, dial 911. Now available from your iPhone and Android! General Information Please provide this summary of care documentation to your next provider. Patient Signature:  ____________________________________________________________ Date:  ____________________________________________________________  
  
Summer Mcnamarayle Provider Signature:  ____________________________________________________________ Date:  ____________________________________________________________

## 2017-06-18 NOTE — PROGRESS NOTES
7215 TRANSFER - IN REPORT:    Verbal report received from Patricia(name) on Giuliana Bills  being received from Cath Lab(unit) for routine progression of care      Report consisted of patients Situation, Background, Assessment and   Recommendations(SBAR). Information from the following report(s) SBAR, Kardex, ED Summary, Procedure Summary, Intake/Output, MAR, Accordion and Recent Results was reviewed with the receiving nurse. Opportunity for questions and clarification was provided. Assessment completed upon patients arrival to unit and care assumed. 0514 Pt arrived to unit via bed. Pt diaphoretic and c/o chest pain. Primary Nurse Temi Todd RN and Le Ruiz RN performed a dual skin assessment on this patient No impairment noted  Raul score is 22     0730 Bedside and Verbal shift change report given to Eladia Coombs (oncoming nurse) by Le Jones (offgoing nurse). Report included the following information SBAR, Kardex, ED Summary, Procedure Summary, Intake/Output, MAR, Accordion and Recent Results.

## 2017-06-18 NOTE — PROGRESS NOTES
1930 Bedside and Verbal shift change report given to Chuck Gracia (oncoming nurse) by Eugenie Pruett (offgoing nurse). Report included the following information SBAR, Kardex, Intake/Output, MAR, Accordion and Recent Results. Pallas.Erum Thompson @ bedside, orders received. 0730 Bedside and Verbal shift change report given to David Mckeon (oncoming nurse) by Chuck Gracia (offgoing nurse). Report included the following information SBAR, Kardex, ED Summary, Procedure Summary, Intake/Output, MAR, Accordion and Recent Results.

## 2017-06-18 NOTE — ED PROVIDER NOTES
HPI Comments: 24 yo male with h/o HTN here with cough x 4 days. Pt c/o sob. Cough is nonproductive. Cp is substernal and nothing makes it better or worse. Also c/o HA. [facility]  Pt told he had a touch of pneumonia. Pt called and got final reading of cxr and states he was told it showed bronchitis. Pt treated yesterday with robitussin AC and doxy. No other pain medications or anytipyretics pta. Denies fevers, vomiting, leg swelling or any other complaints. SHX:  Nonsmoker. No sick contacts. The history is provided by a parent and the patient (mother and fater). Past Medical History:   Diagnosis Date    Hypertension        Past Surgical History:   Procedure Laterality Date    HX APPENDECTOMY           History reviewed. No pertinent family history. Social History     Social History    Marital status: SINGLE     Spouse name: N/A    Number of children: N/A    Years of education: N/A     Occupational History    Not on file. Social History Main Topics    Smoking status: Never Smoker    Smokeless tobacco: Not on file    Alcohol use No    Drug use: No    Sexual activity: Not Currently     Other Topics Concern    Not on file     Social History Narrative         ALLERGIES: Review of patient's allergies indicates no known allergies. Review of Systems   Constitutional: Negative for fever. HENT: Negative for congestion. Respiratory: Positive for shortness of breath. Cardiovascular: Positive for chest pain. Neurological: Positive for headaches. All other systems reviewed and are negative. Vitals:    06/18/17 0006 06/18/17 0011 06/18/17 0240   BP:  134/78 134/68   Pulse:  81 73   Resp:  20 20   Temp:  98.5 °F (36.9 °C) 98.3 °F (36.8 °C)   SpO2:  96% 98%   Weight: 126.7 kg (279 lb 5.2 oz)              Physical Exam   Nursing note and vitals reviewed. Constitutional: appears well-developed and well-nourished. No distress.  PT WATCHING TV IN NO DISTRESS. HENT:   Head: Normocephalic and atraumatic. Sclera anicteric  Nose: No rhinorrhea  Mouth/Throat: Oropharynx is clear and moist. Pharynx normal  Eyes: Conjunctivae are normal. Pupils are equal, round, and reactive to light. Right eye exhibits no discharge. Left eye exhibits no discharge. No scleral icterus. Neck: Painless normal range of motion. Supple  Cardiovascular: Normal rate, regular rhythm, normal heart sounds and intact distal pulses. Exam reveals no gallop and no friction rub. No murmur heard. Pulmonary/Chest: Effort normal and breath sounds normal. No respiratory distress. no wheezes. no rales. + MID STERNUM COSTOCHONDRAL TENDERNESS REPRODUCING CHEST PAIN. Abdominal: Soft. Bowel sounds are normal. Exhibits no distension and no mass. No tenderness. No guarding. Musculoskeletal: Normal range of motion. no tenderness. No edema  Lymphadenopathy:   No cervical adenopathy. Neurological:  Alert and oriented to person, place, and time. Coordination normal. CN 2-12 intact. Moving all extremities. Skin: Skin is warm and MILD DIAPHORESIS. No rash noted. No pallor. MDM  Number of Diagnoses or Management Options  Critical Care  Total time providing critical care: 30-74 minutes (35 minutes)    ED Course   MULTIPLE C/OS - COUGH, REPRODUCIBLE CP, HA, SOB. TEST RESULTS UNAVAILABLE FROM [facility]. DDX:  BRONCHITIS, PERICARDITIS, PNEUMONIA AND OTHERS. CHECK CXR AND EKG. GIVEN MOTRIN. Procedures    ED EKG interpretation:  Rhythm: normal sinus rhythm; and regular . Rate (approx.): 66; Axis: normal; P wave: normal; QRS interval: normal ; ST/T wave: early repol of III, AVF and TWI in I and AVL; LVH by voltage. This EKG was interpreted by Jerilyn Stovall MD,ED Provider. 2:55 AM  Troponin elevated at 2.28. D/w Dr. Miguel A Carson, cardiology. Reviewed hx, exam and results. Tiger texted EKG to him and agree not stemi. He is coming to evaluate the patient. Updated family.   Ordered ASA.      3:36 AM  Dr. Elias Gomez saw pt and he will take pt to the cath lab. ED EKG interpretation:  Rhythm: normal sinus rhythm; and regular . Rate (approx.): 91; Axis: normal; P wave: normal; QRS interval: normal ; ST/T wave: early repol ST elevation inferiorly and TWI in I and AVL with LVH by voltage;. This EKG was interpreted by Lesley Nguyen MD,ED Provider. Recent Results (from the past 24 hour(s))   EKG, 12 LEAD, INITIAL    Collection Time: 06/18/17  1:19 AM   Result Value Ref Range    Ventricular Rate 66 BPM    Atrial Rate 66 BPM    P-R Interval 184 ms    QRS Duration 100 ms    Q-T Interval 376 ms    QTC Calculation (Bezet) 394 ms    Calculated P Axis 24 degrees    Calculated R Axis 33 degrees    Calculated T Axis 108 degrees    Diagnosis       Normal sinus rhythm with sinus arrhythmia  ST elevation, consider inferior injury or acute infarct  ** ** ACUTE MI / STEMI ** **  Consider right ventricular involvement in acute inferior infarct  No previous ECGs available     CBC WITH AUTOMATED DIFF    Collection Time: 06/18/17  1:36 AM   Result Value Ref Range    WBC 11.9 (H) 4.1 - 11.1 K/uL    RBC 5.14 4.10 - 5.70 M/uL    HGB 13.2 12.1 - 17.0 g/dL    HCT 40.0 36.6 - 50.3 %    MCV 77.8 (L) 80.0 - 99.0 FL    MCH 25.7 (L) 26.0 - 34.0 PG    MCHC 33.0 30.0 - 36.5 g/dL    RDW 13.9 11.5 - 14.5 %    PLATELET 439 893 - 569 K/uL    NEUTROPHILS 61 32 - 75 %    BAND NEUTROPHILS 1 0 - 6 %    LYMPHOCYTES 20 12 - 49 %    MONOCYTES 18 (H) 5 - 13 %    EOSINOPHILS 0 0 - 7 %    BASOPHILS 0 0 - 1 %    ABS. NEUTROPHILS 7.4 1.8 - 8.0 K/UL    ABS. LYMPHOCYTES 2.4 0.8 - 3.5 K/UL    ABS. MONOCYTES 2.1 (H) 0.0 - 1.0 K/UL    ABS. EOSINOPHILS 0.0 0.0 - 0.4 K/UL    ABS.  BASOPHILS 0.0 0.0 - 0.1 K/UL    DF MANUAL      PLATELET COMMENTS LARGE PLATELETS      RBC COMMENTS ANISOCYTOSIS  1+        WBC COMMENTS Pathology Review Requested     METABOLIC PANEL, BASIC    Collection Time: 06/18/17  1:36 AM   Result Value Ref Range    Sodium 136 136 - 145 mmol/L    Potassium 3.7 3.5 - 5.1 mmol/L    Chloride 100 97 - 108 mmol/L    CO2 24 21 - 32 mmol/L    Anion gap 12 5 - 15 mmol/L    Glucose 98 65 - 100 mg/dL    BUN 15 6 - 20 MG/DL    Creatinine 1.32 (H) 0.70 - 1.30 MG/DL    BUN/Creatinine ratio 11 (L) 12 - 20      GFR est AA >60 >60 ml/min/1.73m2    GFR est non-AA >60 >60 ml/min/1.73m2    Calcium 9.1 8.5 - 10.1 MG/DL   TROPONIN I    Collection Time: 06/18/17  1:36 AM   Result Value Ref Range    Troponin-I, Qt. 2.28 (H) <0.05 ng/mL       Xr Chest Pa Lat    Result Date: 6/18/2017  INDICATION: Cough with chest pain for a few days FINDINGS: PA and lateral views of the chest demonstrate a normal cardiomediastinal silhouette and clear lungs bilaterally. The visualized osseous structures are unremarkable.      IMPRESSION: Normal chest.

## 2017-06-19 ENCOUNTER — APPOINTMENT (OUTPATIENT)
Dept: CT IMAGING | Age: 22
DRG: 282 | End: 2017-06-19
Attending: INTERNAL MEDICINE
Payer: COMMERCIAL

## 2017-06-19 LAB
ALBUMIN SERPL BCP-MCNC: 3.2 G/DL (ref 3.5–5)
ALBUMIN/GLOB SERPL: 0.6 {RATIO} (ref 1.1–2.2)
ALP SERPL-CCNC: 42 U/L (ref 45–117)
ALT SERPL-CCNC: 31 U/L (ref 12–78)
ANION GAP BLD CALC-SCNC: 6 MMOL/L (ref 5–15)
AST SERPL W P-5'-P-CCNC: 19 U/L (ref 15–37)
BILIRUB SERPL-MCNC: 0.4 MG/DL (ref 0.2–1)
BUN SERPL-MCNC: 11 MG/DL (ref 6–20)
BUN/CREAT SERPL: 13 (ref 12–20)
CALCIUM SERPL-MCNC: 8.9 MG/DL (ref 8.5–10.1)
CHLORIDE SERPL-SCNC: 108 MMOL/L (ref 97–108)
CO2 SERPL-SCNC: 25 MMOL/L (ref 21–32)
CREAT SERPL-MCNC: 0.87 MG/DL (ref 0.7–1.3)
ERYTHROCYTE [DISTWIDTH] IN BLOOD BY AUTOMATED COUNT: 14.2 % (ref 11.5–14.5)
GLOBULIN SER CALC-MCNC: 5 G/DL (ref 2–4)
GLUCOSE SERPL-MCNC: 101 MG/DL (ref 65–100)
HCT VFR BLD AUTO: 43 % (ref 36.6–50.3)
HGB BLD-MCNC: 14.2 G/DL (ref 12.1–17)
MCH RBC QN AUTO: 25.4 PG (ref 26–34)
MCHC RBC AUTO-ENTMCNC: 33 G/DL (ref 30–36.5)
MCV RBC AUTO: 77.1 FL (ref 80–99)
PLATELET # BLD AUTO: 338 K/UL (ref 150–400)
POTASSIUM SERPL-SCNC: 4.3 MMOL/L (ref 3.5–5.1)
PROT SERPL-MCNC: 8.2 G/DL (ref 6.4–8.2)
RBC # BLD AUTO: 5.58 M/UL (ref 4.1–5.7)
SODIUM SERPL-SCNC: 139 MMOL/L (ref 136–145)
TROPONIN I SERPL-MCNC: 0.49 NG/ML
WBC # BLD AUTO: 9.3 K/UL (ref 4.1–11.1)

## 2017-06-19 PROCEDURE — 74011000258 HC RX REV CODE- 258: Performed by: INTERNAL MEDICINE

## 2017-06-19 PROCEDURE — 84484 ASSAY OF TROPONIN QUANT: CPT | Performed by: INTERNAL MEDICINE

## 2017-06-19 PROCEDURE — 74011250637 HC RX REV CODE- 250/637: Performed by: INTERNAL MEDICINE

## 2017-06-19 PROCEDURE — 74011250636 HC RX REV CODE- 250/636: Performed by: INTERNAL MEDICINE

## 2017-06-19 PROCEDURE — 36415 COLL VENOUS BLD VENIPUNCTURE: CPT | Performed by: INTERNAL MEDICINE

## 2017-06-19 PROCEDURE — 77030032490 HC SLV COMPR SCD KNE COVD -B

## 2017-06-19 PROCEDURE — 80053 COMPREHEN METABOLIC PANEL: CPT | Performed by: INTERNAL MEDICINE

## 2017-06-19 PROCEDURE — 74011000250 HC RX REV CODE- 250: Performed by: INTERNAL MEDICINE

## 2017-06-19 PROCEDURE — 71260 CT THORAX DX C+: CPT

## 2017-06-19 PROCEDURE — 65620000000 HC RM CCU GENERAL

## 2017-06-19 PROCEDURE — 93306 TTE W/DOPPLER COMPLETE: CPT

## 2017-06-19 PROCEDURE — 74011636320 HC RX REV CODE- 636/320: Performed by: INTERNAL MEDICINE

## 2017-06-19 PROCEDURE — 85027 COMPLETE CBC AUTOMATED: CPT | Performed by: INTERNAL MEDICINE

## 2017-06-19 RX ORDER — IBUPROFEN 400 MG/1
400 TABLET ORAL
Status: DISCONTINUED | OUTPATIENT
Start: 2017-06-19 | End: 2017-06-20 | Stop reason: HOSPADM

## 2017-06-19 RX ORDER — SODIUM CHLORIDE 0.9 % (FLUSH) 0.9 %
20 SYRINGE (ML) INJECTION
Status: COMPLETED | OUTPATIENT
Start: 2017-06-19 | End: 2017-06-19

## 2017-06-19 RX ORDER — PREDNISONE 20 MG/1
60 TABLET ORAL
Status: DISCONTINUED | OUTPATIENT
Start: 2017-06-20 | End: 2017-06-20

## 2017-06-19 RX ORDER — SODIUM CHLORIDE 0.9 % (FLUSH) 0.9 %
10 SYRINGE (ML) INJECTION
Status: COMPLETED | OUTPATIENT
Start: 2017-06-19 | End: 2017-06-19

## 2017-06-19 RX ADMIN — ACETAMINOPHEN 650 MG: 325 TABLET, FILM COATED ORAL at 13:20

## 2017-06-19 RX ADMIN — Medication 10 ML: at 21:02

## 2017-06-19 RX ADMIN — IBUPROFEN 400 MG: 400 TABLET, FILM COATED ORAL at 17:52

## 2017-06-19 RX ADMIN — Medication 20 ML: at 11:59

## 2017-06-19 RX ADMIN — IBUPROFEN 400 MG: 400 TABLET, FILM COATED ORAL at 23:57

## 2017-06-19 RX ADMIN — Medication 10 ML: at 05:30

## 2017-06-19 RX ADMIN — CARVEDILOL 6.25 MG: 6.25 TABLET, FILM COATED ORAL at 08:12

## 2017-06-19 RX ADMIN — Medication 10 ML: at 09:36

## 2017-06-19 RX ADMIN — CARVEDILOL 6.25 MG: 6.25 TABLET, FILM COATED ORAL at 17:52

## 2017-06-19 RX ADMIN — AMLODIPINE BESYLATE 10 MG: 5 TABLET ORAL at 08:12

## 2017-06-19 RX ADMIN — HYDROCORTISONE SODIUM SUCCINATE 100 MG: 100 INJECTION, POWDER, FOR SOLUTION INTRAMUSCULAR; INTRAVENOUS at 13:20

## 2017-06-19 RX ADMIN — SODIUM CHLORIDE 125 ML/HR: 900 INJECTION, SOLUTION INTRAVENOUS at 06:01

## 2017-06-19 RX ADMIN — HYDROCORTISONE SODIUM SUCCINATE 100 MG: 100 INJECTION, POWDER, FOR SOLUTION INTRAMUSCULAR; INTRAVENOUS at 05:30

## 2017-06-19 RX ADMIN — PERFLUTREN 1.5 ML: 6.52 INJECTION, SUSPENSION INTRAVENOUS at 11:59

## 2017-06-19 RX ADMIN — Medication 10 ML: at 13:20

## 2017-06-19 RX ADMIN — ACETAMINOPHEN 650 MG: 325 TABLET, FILM COATED ORAL at 02:32

## 2017-06-19 RX ADMIN — IOPAMIDOL 100 ML: 612 INJECTION, SOLUTION INTRAVENOUS at 09:36

## 2017-06-19 RX ADMIN — SODIUM CHLORIDE 100 ML: 900 INJECTION, SOLUTION INTRAVENOUS at 09:36

## 2017-06-19 NOTE — PROGRESS NOTES
Care Management: reviewed chart and demographics. Patient in bed, alert and oriented, very pleasant. Independent in care. Cardiac cath was negative. Planning echo per nurse. Last saw PCP in December 2016. No issues with obtaining medications and plan is to go home, lives with mom an dad. Care Management Interventions  PCP Verified by CM: Yes (this past December 2016)  Mode of Transport at Discharge: Other (see comment) (mom / auto)  Transition of Care Consult (CM Consult):  Other (home: lives with parents)  Katherine Ortez: No  Discharge Durable Medical Equipment:  (no equipment or oxygen at home.)  Physical Therapy Consult: No  Occupational Therapy Consult: No  Speech Therapy Consult: No  Current Support Network:  (lives with dad and mom)  Confirm Follow Up Transport: Family  Plan discussed with Pt/Family/Caregiver: Yes  Discharge Location  Discharge Placement:  (at this point plan is to go home with dad and mom)     Partha Oliveros RN BSN CM

## 2017-06-19 NOTE — ROUTINE PROCESS
08:00 SBAR from [place]    09:15 To CT per order    09:40 Back to ICU without problems    11:20 Echo tech at bedside    19:30 Bedside shift change report given to Shannon (oncoming nurse) by Manish Ulloa (offgoing nurse). Report included the following information SBAR, Kardex, Procedure Summary, Intake/Output, MAR, Accordion, Recent Results, Med Rec Status, Cardiac Rhythm NSR and Alarm Parameters .

## 2017-06-19 NOTE — PROGRESS NOTES
Problem: Pressure Injury - Risk of  Goal: *Prevention of pressure ulcer  Outcome: Progressing Towards Goal  Turns Self

## 2017-06-19 NOTE — CDMP QUERY
Dear Dr. Fatou Merritt,    Patient is noted to have a BMI of 38.7. Please clarify if this patient is:     =>Morbidly obese (BMI ³ 40)  =>Obese (BMI 30 - 39.9)  =>Overweight (BMI 25 - 29.9)  =>Other Explanation of clinical findings  =>Unable to Determine (no explanation of clinical findings)    The medical record reflects the following clinical findings, treatment, and risk factors:    Presentation- BMI 38.7, Ht: 5' 10.5\" (1.791 m),  Wt: 124.1 kg (273 lb 9.6 oz)      REFERENCE:  The [address] [place] [address] has issued a statement indicating that, \"Individuals who are overweight, obese, or morbidly obese are at an increased risk for certain medical conditions when compared to persons of normal weight. Therefore, these conditions are always clinically significant and reportable when documented by the provider. Please clarify and document your clinical opinion in the progress notes and discharge summary including the definitive and/or presumptive diagnosis, (suspected or probable), related to the above clinical findings. Please include clinical findings supporting your diagnosis.     Thank You  Sylvester Miles,MSN,BSN,RN,[facility]  [phone]

## 2017-06-19 NOTE — PROGRESS NOTES
Cardiology Progress Note                                        Admit Date: 6/18/2017    Assessment/Plan:     Myocarditis; still on IV steroid; all viral test on swab is negative so far  NSTEMI from myocarditis; number is coming down; he is still having vague discomfort; echo and CT of chest  Obesity; BMI of 37; he was advised to lose more weight; he used weigh higher than this     Melissa Valladares is a 23 y.o. male with     PROBLEM LIST:  Patient Active Problem List    Diagnosis Date Noted    Chest pain 06/18/2017     Priority: 1 - One    NSTEMI (non-ST elevated myocardial infarction) ([address] 06/18/2017         Subjective:     Melissa Valladares reports none. Visit Vitals    /73    Pulse 73    Temp 98 °F (36.7 °C)    Resp 12    Ht 5' 10.5\" (1.791 m)    Wt 124.1 kg (273 lb 9.6 oz)    SpO2 96%    BMI 38.7 kg/m2       Intake/Output Summary (Last 24 hours) at 06/19/17 0621  Last data filed at 06/19/17 0600   Gross per 24 hour   Intake          2847.92 ml   Output             1250 ml   Net          1597.92 ml       Objective:      Physical Exam:  HEENT: [place], EOMI  Neck: No JVD,  No thyroidmegaly  Resp: CTA bilaterally;  No wheezes or rales  CV: RRR s1s2 No murmur no s3  Abd:Soft, Nontender  Ext: No edema  Neuro: Alert and oriented; Nonfocal  Skin: Warm, Dry, Intact  Pulses: 2+ DP/PT/Rad      Telemetry: normal sinus rhythm    Current Facility-Administered Medications   Medication Dose Route Frequency    sodium chloride (NS) flush 5-10 mL  5-10 mL IntraVENous Q8H    sodium chloride (NS) flush 5-10 mL  5-10 mL IntraVENous PRN    acetaminophen (TYLENOL) tablet 650 mg  650 mg Oral Q4H PRN    hydrocortisone Sod Succ (PF) (SOLU-CORTEF) injection 100 mg  100 mg IntraVENous Q8H    carvedilol (COREG) tablet 6.25 mg  6.25 mg Oral BID WITH MEALS    amLODIPine (NORVASC) tablet 10 mg  10 mg Oral DAILY    0.9% sodium chloride infusion  125 mL/hr IntraVENous CONTINUOUS    morphine injection 2 mg  2 mg IntraVENous Q4H PRN         Data Review:   Labs:    Recent Results (from the past 24 hour(s))   RESPIRATORY VIRAL PANEL, PCR    Collection Time: 06/18/17  6:34 AM   Result Value Ref Range    Adenovirus NOT DETECTED NOTD      Coronavirus 229E NOT DETECTED NOTD      Coronavirus HKU1 NOT DETECTED NOTD      Coronavirus CVNL63 NOT DETECTED NOTD      Coronavirus OC43 NOT DETECTED NOTD      Metapneumovirus NOT DETECTED NOTD      Rhinovirus and Enterovirus NOT DETECTED NOTD      Influenza A NOT DETECTED NOTD      Influenza A, subtype H1 NOT DETECTED NOTD      Influenza A, subtype H3 NOT DETECTED NOTD      INFLUENZA A H1N1 PCR NOT DETECTED NOTD      Influenza B NOT DETECTED NOTD      Parainfluenza 1 NOT DETECTED NOTD      Parainfluenza 2 NOT DETECTED NOTD      Parainfluenza 3 NOT DETECTED NOTD      Parainfluenza virus 4 NOT DETECTED NOTD      RSV by PCR NOT DETECTED NOTD      Bordetella pertussis - PCR NOT DETECTED NOTD      Chlamydophila pneumoniae DNA, QL, PCR NOT DETECTED NOTD      Mycoplasma pneumoniae DNA, QL, PCR NOT DETECTED NOTD     ECG RHYTHM ANALYSIS ADULT    Collection Time: 06/18/17  6:47 AM   Result Value Ref Range    Ventricular Rate 68 BPM    Atrial Rate 75 BPM    P-R Interval 200 ms    QRS Duration 102 ms    Q-T Interval 392 ms    QTC Calculation (Bezet) 416 ms    Calculated P Axis 28 degrees    Calculated R Axis 19 degrees    Calculated T Axis 95 degrees    Diagnosis       Sinus rhythm with marked sinus arrhythmia  ST elevation, consider inferior injury or acute infarct  ** ACUTE MI **  Consider right ventricular involvement in acute inferior infarct  When compared with ECG of 18-JUN-2017 03:21,  No significant change was found  Confirmed by Mabel Olivo ([id number]) on 6/18/2017 9:39:44 AM     TROPONIN I    Collection Time: 06/18/17  9:10 AM   Result Value Ref Range    Troponin-I, Qt. 2.54 (H) <0.05 ng/mL   TROPONIN I    Collection Time: 06/18/17  3:02 PM   Result Value Ref Range    Troponin-I, Qt. 1.06 (H) <0.05 ng/mL   TROPONIN I    Collection Time: 06/18/17 10:59 PM   Result Value Ref Range    Troponin-I, Qt. 0.76 (H) <0.05 ng/mL

## 2017-06-19 NOTE — PROGRESS NOTES
Problem: Falls - Risk of  Goal: *Absence of falls  Outcome: Progressing Towards Goal  Up with assistance an steady on his feet    Problem: Cath Lab Procedures: Post-Cath Day 1  Goal: Activity/Safety  Outcome: Progressing Towards Goal  bedrest  Goal: Diagnostic Test/Procedures  Outcome: Progressing Towards Goal  Awaiting echo  Goal: Nutrition/Diet  Outcome: Progressing Towards Goal  Poor appetite  Goal: Medications  Outcome: Progressing Towards Goal  On coreg, asa, brilenta, integrilin and lisinopril.  Watching creatinine  Goal: Respiratory  Outcome: Progressing Towards Goal  On NC per AHA guidelines and for chest pain

## 2017-06-19 NOTE — INTERDISCIPLINARY ROUNDS
IDR/SLIDR Summary          Patient: Joselito Celaya MRN: [id number]    Age: 23 y.o. YOB: 1995 Room/Bed: [address] [place] [address]   Admit Diagnosis: Chest pain  NSTEMI (non-ST elevated myocardial infarction) ([facility]  Principal Diagnosis: <principal problem not specified>   Goals: Relieve chest discomfort, Cause of myocarditis? Readmission: NO  Quality Measure: Not applicable  VTE Prophylaxis: Mechanical  Influenza Vaccine screening completed? YES  Pneumococcal Vaccine screening completed? YES  Mobility needs: No   Nutrition plan:Yes  Consults:Case Management    Financial concerns:No  Escalated to CM? NO  RRAT Score:    Interventions:H2H  Testing due for pt today?  NO  LOS: 1 days Expected length of stay 3 days  Discharge plan: Home   PCP: Casey Quinn MD  Transportation needs: No    Days before discharge:two or more days before discharge   Discharge disposition: Home    Signed:     Jose Roberto Stapleton RN  6/19/2017  4:18 AM

## 2017-06-19 NOTE — PROGRESS NOTES
Bedside and Verbal shift change report given to Jessa Mckeon RN (oncoming nurse) by Manish Ulloa RN (offgoing nurse). Report included the following information SBAR, Kardex, Intake/Output, MAR, Accordion, Recent Results and Cardiac Rhythm - NSR.     2000 - Assumed care. Pt resting comfortably, VSS, RA, NSR. C/o mild 4/10 chest pain/soreness, no other s/s, requesting prn motrin when available. Needs met, family at bedside, plan of care reviewed. 0000- PRN Motrin given per pt request, resting comfortably following, VSS.  0400 - No change in status, pt resting comfortably, VSS.  0600 - Dr. Kranthi Huitron in to see pt. Bedside and Verbal shift change report given to Manish Ulloa [place] (oncoming nurse) by Jessa Mckeon RN (offgoing nurse). Report included the following information SBAR, Kardex, Intake/Output, MAR, Accordion, Recent Results and Cardiac Rhythm - NSR.

## 2017-06-20 VITALS
HEART RATE: 72 BPM | RESPIRATION RATE: 20 BRPM | WEIGHT: 275.7 LBS | OXYGEN SATURATION: 98 % | DIASTOLIC BLOOD PRESSURE: 61 MMHG | TEMPERATURE: 97.8 F | BODY MASS INDEX: 38.6 KG/M2 | HEIGHT: 71 IN | SYSTOLIC BLOOD PRESSURE: 136 MMHG

## 2017-06-20 LAB
ALBUMIN SERPL BCP-MCNC: 2.9 G/DL (ref 3.5–5)
ALBUMIN/GLOB SERPL: 0.7 {RATIO} (ref 1.1–2.2)
ALP SERPL-CCNC: 33 U/L (ref 45–117)
ALT SERPL-CCNC: 37 U/L (ref 12–78)
ANION GAP BLD CALC-SCNC: 7 MMOL/L (ref 5–15)
AST SERPL W P-5'-P-CCNC: 24 U/L (ref 15–37)
BASOPHILS # BLD AUTO: 0 K/UL (ref 0–0.1)
BASOPHILS # BLD: 0 % (ref 0–1)
BILIRUB SERPL-MCNC: 0.3 MG/DL (ref 0.2–1)
BUN SERPL-MCNC: 16 MG/DL (ref 6–20)
BUN/CREAT SERPL: 16 (ref 12–20)
CALCIUM SERPL-MCNC: 8.7 MG/DL (ref 8.5–10.1)
CHLORIDE SERPL-SCNC: 108 MMOL/L (ref 97–108)
CHOLEST SERPL-MCNC: 152 MG/DL
CO2 SERPL-SCNC: 25 MMOL/L (ref 21–32)
CREAT SERPL-MCNC: 0.97 MG/DL (ref 0.7–1.3)
CV B1 AB TITR SER CF: ABNORMAL {TITER}
CV B2 AB TITR SER CF: ABNORMAL {TITER}
CV B3 AB TITR SER CF: NEGATIVE {TITER}
CV B4 AB TITR SER CF: NEGATIVE {TITER}
CV B5 AB TITR SER CF: NEGATIVE {TITER}
CV B6 AB TITR SER CF: ABNORMAL {TITER}
EOSINOPHIL # BLD: 0.1 K/UL (ref 0–0.4)
EOSINOPHIL NFR BLD: 1 % (ref 0–7)
ERYTHROCYTE [DISTWIDTH] IN BLOOD BY AUTOMATED COUNT: 14.2 % (ref 11.5–14.5)
GLOBULIN SER CALC-MCNC: 4.3 G/DL (ref 2–4)
GLUCOSE SERPL-MCNC: 79 MG/DL (ref 65–100)
HCT VFR BLD AUTO: 39.3 % (ref 36.6–50.3)
HDLC SERPL-MCNC: 34 MG/DL
HDLC SERPL: 4.5 {RATIO} (ref 0–5)
HGB BLD-MCNC: 12.7 G/DL (ref 12.1–17)
LDLC SERPL CALC-MCNC: 100.6 MG/DL (ref 0–100)
LIPID PROFILE,FLP: ABNORMAL
LYMPHOCYTES # BLD AUTO: 48 % (ref 12–49)
LYMPHOCYTES # BLD: 4.3 K/UL (ref 0.8–3.5)
MAGNESIUM SERPL-MCNC: 2.2 MG/DL (ref 1.6–2.4)
MCH RBC QN AUTO: 25.1 PG (ref 26–34)
MCHC RBC AUTO-ENTMCNC: 32.3 G/DL (ref 30–36.5)
MCV RBC AUTO: 77.8 FL (ref 80–99)
MONOCYTES # BLD: 0.9 K/UL (ref 0–1)
MONOCYTES NFR BLD AUTO: 10 % (ref 5–13)
NEUTS SEG # BLD: 3.8 K/UL (ref 1.8–8)
NEUTS SEG NFR BLD AUTO: 41 % (ref 32–75)
PHOSPHATE SERPL-MCNC: 3.9 MG/DL (ref 2.6–4.7)
PLATELET # BLD AUTO: 329 K/UL (ref 150–400)
POTASSIUM SERPL-SCNC: 4.1 MMOL/L (ref 3.5–5.1)
PROT SERPL-MCNC: 7.2 G/DL (ref 6.4–8.2)
RBC # BLD AUTO: 5.05 M/UL (ref 4.1–5.7)
SODIUM SERPL-SCNC: 140 MMOL/L (ref 136–145)
TRIGL SERPL-MCNC: 87 MG/DL (ref ?–150)
VLDLC SERPL CALC-MCNC: 17.4 MG/DL
WBC # BLD AUTO: 9.1 K/UL (ref 4.1–11.1)

## 2017-06-20 PROCEDURE — 80053 COMPREHEN METABOLIC PANEL: CPT | Performed by: INTERNAL MEDICINE

## 2017-06-20 PROCEDURE — 93005 ELECTROCARDIOGRAM TRACING: CPT

## 2017-06-20 PROCEDURE — 85025 COMPLETE CBC W/AUTO DIFF WBC: CPT | Performed by: INTERNAL MEDICINE

## 2017-06-20 PROCEDURE — 83735 ASSAY OF MAGNESIUM: CPT | Performed by: INTERNAL MEDICINE

## 2017-06-20 PROCEDURE — 80061 LIPID PANEL: CPT | Performed by: INTERNAL MEDICINE

## 2017-06-20 PROCEDURE — 36415 COLL VENOUS BLD VENIPUNCTURE: CPT | Performed by: INTERNAL MEDICINE

## 2017-06-20 PROCEDURE — 74011250637 HC RX REV CODE- 250/637: Performed by: INTERNAL MEDICINE

## 2017-06-20 PROCEDURE — 84100 ASSAY OF PHOSPHORUS: CPT | Performed by: INTERNAL MEDICINE

## 2017-06-20 PROCEDURE — 74011636637 HC RX REV CODE- 636/637: Performed by: INTERNAL MEDICINE

## 2017-06-20 RX ORDER — IBUPROFEN 400 MG/1
400 TABLET ORAL
Qty: 50 TAB | Refills: 1 | Status: SHIPPED | OUTPATIENT
Start: 2017-06-20

## 2017-06-20 RX ORDER — CARVEDILOL 6.25 MG/1
6.25 TABLET ORAL 2 TIMES DAILY WITH MEALS
Qty: 60 TAB | Refills: 6 | Status: SHIPPED | OUTPATIENT
Start: 2017-06-20

## 2017-06-20 RX ADMIN — IBUPROFEN 400 MG: 400 TABLET, FILM COATED ORAL at 08:10

## 2017-06-20 RX ADMIN — AMLODIPINE BESYLATE 10 MG: 5 TABLET ORAL at 08:07

## 2017-06-20 RX ADMIN — PREDNISONE 60 MG: 20 TABLET ORAL at 08:06

## 2017-06-20 RX ADMIN — Medication 10 ML: at 05:57

## 2017-06-20 RX ADMIN — CARVEDILOL 6.25 MG: 6.25 TABLET, FILM COATED ORAL at 08:07

## 2017-06-20 NOTE — CARDIO/PULMONARY
Cardiac Wellness; Visited to provide education for HTN and low sodium diet to [facility] per staff RN and Dr Hannah Vann. The pt. had elevated troponin with NSTEMI diagnosis with normal coronary arteries found  on cath and EF of 60%. Diagnosis related to myocarditis. Discussed the diagnosis of HTN and risk factors related to disease as well as long term complications to vital organs including brain, heart, kidneys and eyes. Joselito Celaya has had HTN treated for several years, increased BMI, and physical inactivity. Smoking history assessed and he is a non smoker. 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 Discussed the need to follow a Heart Healthy low sodium diet. Suggested he keep sodium intake to 2000mg/daily and to keep a food log to track sodium daily. Also encouraged exercise and discussed the need for medication compliance and routine follow up with cardiology. [facility] verbalized understanding and all questions answered. Mariam Carson RN

## 2017-06-20 NOTE — ROUTINE PROCESS
08:00 SBAR from [address] [place] [address] in halls of CCU and the 4th floor without problems. He has discharge orders when mother arrives. 11:55 he has been discharged in the care of his mother. He has instruction and prescriptions which I went over with both he and his mother. He is going to make a follow up visit with Dr. Ruperto Sinclair in the next month and has the phone number.

## 2017-06-20 NOTE — DISCHARGE INSTRUCTIONS
[email or web address]    Patient Discharge Instructions    Teagan Ervin / [id number] : 1995    Admitted 2017 Discharged: 2017           · It is important that you take the medication exactly as they are prescribed. · Keep your medication in the bottles provided by the pharmacist and keep a list of the medication names, dosages, and times to be taken in your wallet. · Do not take other medications without consulting your doctor. BRING ALL OF YOUR MEDICINES TO YOUR OFFICE VISIT with     What to do at Home    Recommended activity: Activity as tolerated,     Follow-up with Gerson Bello MD in 1 month    Follow-up with your primary care physician in 3 month    Lifestyle changes  Do not smoke. Smoking increases your risk of another heart attack. If you need help quitting, talk to your doctor about stop-smoking programs and medicines. These can increase your chances of quitting for good. Eat a heart-healthy diet that is low in cholesterol, saturated fat, and salt, and is full of fruits, vegetables and whole-grains. Eat at least two servings of fish each week. You may get more details about how to eat healthy, but these tips can help you get started. Avoid colds and flu. Get a pneumococcal vaccine shot. If you have had one before, ask your doctor whether you need a second dose. Get a flu shot every fall. If you must be around people with colds or flu, wash your hands often. When should you call for help? Call 911 anytime you think you may need emergency care. For example, call if:  You have signs of a heart attack. These may include:  Chest pain or pressure. Sweating. Shortness of breath. Nausea or vomiting. Pain that spreads from the chest to the neck, jaw, or one or both shoulders or arms. Dizziness or lightheadedness. A fast or irregular pulse. After calling 911, chew 1 adult-strength aspirin. Wait for an ambulance. Do not try to drive yourself.   You passed out (lost consciousness). You feel like you are having another heart attack. Call your doctor now or seek immediate medical care if:  You have had any chest pain, even if it has gone away. You have new or increased shortness of breath. You are dizzy or lightheaded, or you feel like you may faint. Watch closely for changes in your health, and be sure to contact your doctor if you have any problem      Information obtained by :  I understand that if any problems occur once I am at home I am to contact my physician. I understand and acknowledge receipt of the instructions indicated above. R.N.'s Signature                                                                  Date/Time                                                                                                                                              Patient or Representative Signature                                                          Date/Time      Yana Boucher MD                       Cardiac Catheterization/Angiography Discharge Instructions    *Check the puncture site frequently for swelling or bleeding. If you see any bleeding, lie down and apply pressure over the area with a clean town or washcloth. Notify your doctor for any redness, swelling, drainage or oozing from the puncture site. Notify your doctor for any fever or chills. *If the leg or arm with the puncture becomes cold, numb or painful, call Dr Liana Bernabe at [phone]    *Activity should be limited for the next 48 hours. Climb stairs as little as possible and avoid any stooping, bending or strenuous activity for 48 hours. No heavy lifting (anything over 10 pounds) for three days. *Do not drive for 48 hours. *You may resume your usual diet.  Drink more fluids than usual.    *Have a responsible person drive you home and stay with you for at least 24 hours after your heart catheterization/angiography. *You may remove the bandage from your Right and Groin in 24 hours. You may shower in 24 hours. No tub baths, hot tubs or swimming for one week. Do not place any lotions, creams, powders, ointments over the puncture site for one week. You may place a clean band-aid over the puncture site each day for 5 days. Change this daily.

## 2017-06-20 NOTE — INTERDISCIPLINARY ROUNDS
IDR/SLIDR Summary          Patient: Lilia Thomson MRN: [id number]    Age: 23 y.o. YOB: 1995 Room/Bed: [address] [place] [address]   Admit Diagnosis: Chest pain  NSTEMI (non-ST elevated myocardial infarction) [facility]  Principal Diagnosis: <principal problem not specified>   Goals: improve BP  Readmission: NO  Quality Measure: SCIP  VTE Prophylaxis: Mechanical  Influenza Vaccine screening completed? YES  Pneumococcal Vaccine screening completed? YES  Mobility needs: No   Nutrition plan:Yes  Consults:Case Management    Financial concerns:No  Escalated to CM? YES  RRAT Score: 7   Interventions:H2H  Testing due for pt today? NO  LOS: 1 days Expected length of stay ?  days  Discharge plan: home   PCP: Keila Pastrana MD  Transportation needs: No    Days before discharge:one day until discharge   Discharge disposition: Home    Signed:     Elvie Osborn RN  6/19/2017  11:17 PM

## 2017-06-20 NOTE — DISCHARGE SUMMARY
Cardiology Discharge Summary     Patient ID:  Sudarshan Baker  [id number]  39 y.o.  1995    Admit Date: 6/18/2017    Discharge Date: 6/20/2017     Admitting Physician: Leopold Gut, MD     Discharge Physician: Leopold Gut, MD    Admission Diagnoses: Chest pain  NSTEMI (non-ST elevated myocardial infarction) [facility])    Discharge Diagnoses: Active Problems:    Chest pain (6/18/2017)      NSTEMI (non-ST elevated myocardial infarction) ([address] (6/18/2017)    myocarditis  HTN    Discharge Condition: Stable    Cardiology Procedures this Admission:  Diagnostic left heart catheterization    Hospital Course: He presented with acute CP/cough/sweats. His troponin was elevated at 2.28 c/w NSTEMI. EKG showed even abnormality c/w acute inferior injury. His cath showed normal coronaries. HIs EF was preserved from cath as well as echo which revealed no pericardial effusion. His diagnosis was myocarditis from probable viral source. A viral swab of throat revealed nothing. A special titer for other virus is sent with result pending. He was treated with Steroid and then Motrin which resolution of his sx. His CP resolved. CT of chest revealed no abnormal LN or mass. He is released in stable condition. Consults: None    Discharge Exam:     Visit Vitals    /78    Pulse 66    Temp 97.8 °F (36.6 °C)    Resp 20    Ht 5' 10.5\" (1.791 m)    Wt 125.1 kg (275 lb 11.2 oz)    SpO2 97%    BMI 39 kg/m2     General Appearance:  Well developed, well nourished, in no acute distress. Ears/Nose/Mouth/Throat:   Hearing grossly normal.         Neck: Supple. Chest:   Lungs clear to auscultation bilaterally. Normal resp effort. Cardiovascular:  Regular rate and rhythm, S1, S2 normal, no new murmur. Abdomen:   Soft, non-tender, bowel sounds are present. Extremities: No edema bilaterally. Neuro:  Skin: A/O x 3, grossly nonfocal. Normal mood/affect. Warm and dry.                Disposition: home    Patient Instructions:   Current Discharge Medication List      START taking these medications    Details   carvedilol (COREG) 6.25 mg tablet Take 1 Tab by mouth two (2) times daily (with meals). Qty: 60 Tab, Refills: 6      ibuprofen (MOTRIN) 400 mg tablet Take 1 Tab by mouth three (3) times daily as needed. Qty: 50 Tab, Refills: 1         CONTINUE these medications which have NOT CHANGED    Details   guaiFENesin (ROBITUSSIN) 100 mg/5 mL liquid Take 200 mg by mouth three (3) times daily as needed for Cough. doxycycline (MONODOX) 100 mg capsule Take 100 mg by mouth two (2) times a day. Saccharomyces boulardii (FLORASTORKIDS) 250 mg pack Take 250 mg by mouth two (2) times a day. amLODIPine (NORVASC) 10 mg tablet Take  by mouth daily. Referenced discharge instructions provided by nursing for diet and activity.     Follow-up with Dr. Lisa Melton in one month     Signed:  Ami Paredes MD  6/20/2017  9:19 AM

## 2017-06-23 ENCOUNTER — TELEPHONE (OUTPATIENT)
Dept: CARDIAC REHAB | Age: 22
End: 2017-06-23

## 2017-06-23 LAB
ATRIAL RATE: 63 BPM
CALCULATED P AXIS, ECG09: 33 DEGREES
CALCULATED R AXIS, ECG10: 12 DEGREES
CALCULATED T AXIS, ECG11: 62 DEGREES
DIAGNOSIS, 93000: NORMAL
P-R INTERVAL, ECG05: 154 MS
Q-T INTERVAL, ECG07: 416 MS
QRS DURATION, ECG06: 102 MS
QTC CALCULATION (BEZET), ECG08: 425 MS
VENTRICULAR RATE, ECG03: 63 BPM

## 2022-08-11 NOTE — ED TRIAGE NOTES
Auth Denied, determination in chart for review.    \"I haven't been feeling well for the past few days and I went to [facility] last night and they diagnosed me with bronchitis. \"  Pt. Denies fever, feels his SOB in getting worse. Per mother pt. Was given an antibiotic for \"a touch of pneumonia\".